# Patient Record
(demographics unavailable — no encounter records)

---

## 2025-02-27 NOTE — HISTORY OF PRESENT ILLNESS
[FreeTextEntry1] : 72 M with a history of CAD s/p PCI x 2 in 2016, HTN, HLD.  Feels well.  Occasional sharp right-sided chest pain and palpitations.   2/2025 EKG:  SR, RBBB, no ST-T changes